# Patient Record
Sex: FEMALE | Race: WHITE | NOT HISPANIC OR LATINO | Employment: FULL TIME | URBAN - METROPOLITAN AREA
[De-identification: names, ages, dates, MRNs, and addresses within clinical notes are randomized per-mention and may not be internally consistent; named-entity substitution may affect disease eponyms.]

---

## 2023-11-22 ENCOUNTER — OFFICE VISIT (OUTPATIENT)
Dept: FAMILY MEDICINE | Facility: CLINIC | Age: 43
End: 2023-11-22
Payer: COMMERCIAL

## 2023-11-22 VITALS
OXYGEN SATURATION: 98 % | BODY MASS INDEX: 28.47 KG/M2 | HEIGHT: 61 IN | HEART RATE: 79 BPM | DIASTOLIC BLOOD PRESSURE: 78 MMHG | WEIGHT: 150.81 LBS | TEMPERATURE: 97 F | SYSTOLIC BLOOD PRESSURE: 100 MMHG

## 2023-11-22 DIAGNOSIS — R05.9 COUGH, UNSPECIFIED TYPE: ICD-10-CM

## 2023-11-22 DIAGNOSIS — J10.1 INFLUENZA A: Primary | ICD-10-CM

## 2023-11-22 LAB
CTP QC/QA: YES
CTP QC/QA: YES
POC MOLECULAR INFLUENZA A AGN: POSITIVE
POC MOLECULAR INFLUENZA B AGN: NEGATIVE
SARS-COV-2 RDRP RESP QL NAA+PROBE: NEGATIVE

## 2023-11-22 PROCEDURE — 99999 PR PBB SHADOW E&M-NEW PATIENT-LVL III: CPT | Mod: PBBFAC,,, | Performed by: STUDENT IN AN ORGANIZED HEALTH CARE EDUCATION/TRAINING PROGRAM

## 2023-11-22 PROCEDURE — 87502 INFLUENZA DNA AMP PROBE: CPT | Mod: QW,S$GLB,, | Performed by: STUDENT IN AN ORGANIZED HEALTH CARE EDUCATION/TRAINING PROGRAM

## 2023-11-22 PROCEDURE — 99999 PR PBB SHADOW E&M-NEW PATIENT-LVL III: ICD-10-PCS | Mod: PBBFAC,,, | Performed by: STUDENT IN AN ORGANIZED HEALTH CARE EDUCATION/TRAINING PROGRAM

## 2023-11-22 PROCEDURE — 1159F MED LIST DOCD IN RCRD: CPT | Mod: CPTII,S$GLB,, | Performed by: STUDENT IN AN ORGANIZED HEALTH CARE EDUCATION/TRAINING PROGRAM

## 2023-11-22 PROCEDURE — 99213 PR OFFICE/OUTPT VISIT, EST, LEVL III, 20-29 MIN: ICD-10-PCS | Mod: S$GLB,,, | Performed by: STUDENT IN AN ORGANIZED HEALTH CARE EDUCATION/TRAINING PROGRAM

## 2023-11-22 PROCEDURE — 1159F PR MEDICATION LIST DOCUMENTED IN MEDICAL RECORD: ICD-10-PCS | Mod: CPTII,S$GLB,, | Performed by: STUDENT IN AN ORGANIZED HEALTH CARE EDUCATION/TRAINING PROGRAM

## 2023-11-22 PROCEDURE — 87502 POCT INFLUENZA A/B MOLECULAR: ICD-10-PCS | Mod: QW,S$GLB,, | Performed by: STUDENT IN AN ORGANIZED HEALTH CARE EDUCATION/TRAINING PROGRAM

## 2023-11-22 PROCEDURE — 99213 OFFICE O/P EST LOW 20 MIN: CPT | Mod: S$GLB,,, | Performed by: STUDENT IN AN ORGANIZED HEALTH CARE EDUCATION/TRAINING PROGRAM

## 2023-11-22 PROCEDURE — 3008F PR BODY MASS INDEX (BMI) DOCUMENTED: ICD-10-PCS | Mod: CPTII,S$GLB,, | Performed by: STUDENT IN AN ORGANIZED HEALTH CARE EDUCATION/TRAINING PROGRAM

## 2023-11-22 PROCEDURE — 87635 SARS-COV-2 COVID-19 AMP PRB: CPT | Mod: QW,S$GLB,, | Performed by: STUDENT IN AN ORGANIZED HEALTH CARE EDUCATION/TRAINING PROGRAM

## 2023-11-22 PROCEDURE — 87635: ICD-10-PCS | Mod: QW,S$GLB,, | Performed by: STUDENT IN AN ORGANIZED HEALTH CARE EDUCATION/TRAINING PROGRAM

## 2023-11-22 PROCEDURE — 3078F PR MOST RECENT DIASTOLIC BLOOD PRESSURE < 80 MM HG: ICD-10-PCS | Mod: CPTII,S$GLB,, | Performed by: STUDENT IN AN ORGANIZED HEALTH CARE EDUCATION/TRAINING PROGRAM

## 2023-11-22 PROCEDURE — 3074F PR MOST RECENT SYSTOLIC BLOOD PRESSURE < 130 MM HG: ICD-10-PCS | Mod: CPTII,S$GLB,, | Performed by: STUDENT IN AN ORGANIZED HEALTH CARE EDUCATION/TRAINING PROGRAM

## 2023-11-22 PROCEDURE — 3074F SYST BP LT 130 MM HG: CPT | Mod: CPTII,S$GLB,, | Performed by: STUDENT IN AN ORGANIZED HEALTH CARE EDUCATION/TRAINING PROGRAM

## 2023-11-22 PROCEDURE — 3078F DIAST BP <80 MM HG: CPT | Mod: CPTII,S$GLB,, | Performed by: STUDENT IN AN ORGANIZED HEALTH CARE EDUCATION/TRAINING PROGRAM

## 2023-11-22 PROCEDURE — 3008F BODY MASS INDEX DOCD: CPT | Mod: CPTII,S$GLB,, | Performed by: STUDENT IN AN ORGANIZED HEALTH CARE EDUCATION/TRAINING PROGRAM

## 2023-11-22 RX ORDER — OSELTAMIVIR PHOSPHATE 75 MG/1
75 CAPSULE ORAL 2 TIMES DAILY
Qty: 10 CAPSULE | Refills: 0 | Status: SHIPPED | OUTPATIENT
Start: 2023-11-22 | End: 2023-11-27

## 2023-11-22 NOTE — PROGRESS NOTES
"Same day clinic    Holly Miranda is a 43 y.o. year old      White female  has no past medical history on file.. Pt presented to the clinic for flu-like symptoms for the past 2 days.  Symptoms include dry cough, nasal congestion, myalgias, headache.  History of sick contact present.   is diagnosed with flu 1 week ago      ROS:  Negative except above    Vitals:    11/22/23 1022   BP: 100/78   BP Location: Right arm   Patient Position: Sitting   BP Method: Large (Manual)   Pulse: 79   Temp: 96.7 °F (35.9 °C)   TempSrc: Tympanic   SpO2: 98%   Weight: 68.4 kg (150 lb 12.7 oz)   Height: 5' 1" (1.549 m)       Body mass index is 28.49 kg/m².     PE:  General - Well developed, alert and oriented in NAD  HEENT - normocephalic, no evidence of trauma, sclera white, EOMI, nasal congestion present  Neck - full range of motion  COR - regular rate and rhythm without murmurs or gallops  Lungs - Clear  Abdomen - soft, non-tender  Ext - no cyanosis or edema    No results found for: "WBC", "HGB", "HCT", "MCV", "MCH", "MCHC", "RDW", "PLT", "MPV", "NEUTROABS", "LYMPHOABS", "MONOABS", "EOSINOABS", "BASOSABS", "NEUTROPCT", "LYMPHOPCT", "MONOPCT", "EOSINOPCT", "BASOPCT", "DIFFTYPE", "RBCMORPHOLOG", "PLTEST"    No results found for: "NA", "K", "CL", "CO2", "GLU", "BUN", "LABCREA", "CALCIUM", "PROT", "ALBUMIN", "BILITOT", "AST", "ALKPHOS", "ALT", "GFRAA", "GFRNONAA"    No results found for: "TRIG", "CHOL", "HDL", "LDLCALC", "CHOLHDL", "NONHDLC"      No results found for: "LABA1C", "HGBA1C"    No results found for: "MICROALBUR", "[id number]"      Impression:  1. Influenza A  oseltamivir (TAMIFLU) 75 MG capsule      2. Cough, unspecified type  POCT COVID-19 Rapid Screening    POCT Influenza A/B Molecular         POCT influenza is positive.  POCT COVID is negative  Patient has a personal history of asthma.  Prescription sent for Tamiflu 75 mg b.i.d. for 5 days.  ER precautions given    Orders Placed This Encounter   Procedures    POCT " COVID-19 Rapid Screening    POCT Influenza A/B Molecular       No follow-ups on file.     Reema Dai MD

## 2023-11-22 NOTE — LETTER
November 22, 2023      [facility]  [address] [address] [address]  Phone: [phone]  Fax: [phone]       Patient: Holly Miranda   YOB: 1980  Date of Visit: 11/22/2023    To Whom It May Concern:    Leesa Miranda  was at [facility] on 11/22/2023. Please excuse patient for 11/21/23 and 11/22/2023.The patient may return to work/school on 11/27/2023 with no restrictions. If you have any questions or concerns, or if I can be of further assistance, please do not hesitate to contact me.    Sincerely,    Dr. Suhas ROSENABUM.

## 2023-12-28 ENCOUNTER — HOSPITAL ENCOUNTER (OUTPATIENT)
Dept: RADIOLOGY | Facility: HOSPITAL | Age: 43
Discharge: HOME | End: 2023-12-28
Attending: STUDENT IN AN ORGANIZED HEALTH CARE EDUCATION/TRAINING PROGRAM
Payer: COMMERCIAL

## 2023-12-28 ENCOUNTER — OFFICE VISIT (OUTPATIENT)
Dept: FAMILY MEDICINE | Facility: CLINIC | Age: 43
End: 2023-12-28
Payer: COMMERCIAL

## 2023-12-28 VITALS
OXYGEN SATURATION: 99 % | HEIGHT: 62 IN | BODY MASS INDEX: 28.32 KG/M2 | WEIGHT: 153.88 LBS | HEART RATE: 77 BPM | SYSTOLIC BLOOD PRESSURE: 120 MMHG | DIASTOLIC BLOOD PRESSURE: 80 MMHG

## 2023-12-28 DIAGNOSIS — M77.9 BONE SPUR: Primary | ICD-10-CM

## 2023-12-28 DIAGNOSIS — M77.9 BONE SPUR: ICD-10-CM

## 2023-12-28 PROCEDURE — 1159F MED LIST DOCD IN RCRD: CPT | Mod: CPTII,S$GLB,, | Performed by: STUDENT IN AN ORGANIZED HEALTH CARE EDUCATION/TRAINING PROGRAM

## 2023-12-28 PROCEDURE — 73080 X-RAY EXAM OF ELBOW: CPT | Mod: TC,FY,PO,LT

## 2023-12-28 PROCEDURE — 3008F PR BODY MASS INDEX (BMI) DOCUMENTED: ICD-10-PCS | Mod: CPTII,S$GLB,, | Performed by: STUDENT IN AN ORGANIZED HEALTH CARE EDUCATION/TRAINING PROGRAM

## 2023-12-28 PROCEDURE — 99999 PR PBB SHADOW E&M-EST. PATIENT-LVL III: ICD-10-PCS | Mod: PBBFAC,,, | Performed by: STUDENT IN AN ORGANIZED HEALTH CARE EDUCATION/TRAINING PROGRAM

## 2023-12-28 PROCEDURE — 99213 PR OFFICE/OUTPT VISIT, EST, LEVL III, 20-29 MIN: ICD-10-PCS | Mod: S$GLB,,, | Performed by: STUDENT IN AN ORGANIZED HEALTH CARE EDUCATION/TRAINING PROGRAM

## 2023-12-28 PROCEDURE — 3079F PR MOST RECENT DIASTOLIC BLOOD PRESSURE 80-89 MM HG: ICD-10-PCS | Mod: CPTII,S$GLB,, | Performed by: STUDENT IN AN ORGANIZED HEALTH CARE EDUCATION/TRAINING PROGRAM

## 2023-12-28 PROCEDURE — 3074F SYST BP LT 130 MM HG: CPT | Mod: CPTII,S$GLB,, | Performed by: STUDENT IN AN ORGANIZED HEALTH CARE EDUCATION/TRAINING PROGRAM

## 2023-12-28 PROCEDURE — 99999 PR PBB SHADOW E&M-EST. PATIENT-LVL III: CPT | Mod: PBBFAC,,, | Performed by: STUDENT IN AN ORGANIZED HEALTH CARE EDUCATION/TRAINING PROGRAM

## 2023-12-28 PROCEDURE — 1159F PR MEDICATION LIST DOCUMENTED IN MEDICAL RECORD: ICD-10-PCS | Mod: CPTII,S$GLB,, | Performed by: STUDENT IN AN ORGANIZED HEALTH CARE EDUCATION/TRAINING PROGRAM

## 2023-12-28 PROCEDURE — 3079F DIAST BP 80-89 MM HG: CPT | Mod: CPTII,S$GLB,, | Performed by: STUDENT IN AN ORGANIZED HEALTH CARE EDUCATION/TRAINING PROGRAM

## 2023-12-28 PROCEDURE — 99213 OFFICE O/P EST LOW 20 MIN: CPT | Mod: S$GLB,,, | Performed by: STUDENT IN AN ORGANIZED HEALTH CARE EDUCATION/TRAINING PROGRAM

## 2023-12-28 PROCEDURE — 73080 XR ELBOW COMPLETE 3 VIEW LEFT: ICD-10-PCS | Mod: 26,LT,, | Performed by: RADIOLOGY

## 2023-12-28 PROCEDURE — 73080 X-RAY EXAM OF ELBOW: CPT | Mod: 26,LT,, | Performed by: RADIOLOGY

## 2023-12-28 PROCEDURE — 3008F BODY MASS INDEX DOCD: CPT | Mod: CPTII,S$GLB,, | Performed by: STUDENT IN AN ORGANIZED HEALTH CARE EDUCATION/TRAINING PROGRAM

## 2023-12-28 PROCEDURE — 3074F PR MOST RECENT SYSTOLIC BLOOD PRESSURE < 130 MM HG: ICD-10-PCS | Mod: CPTII,S$GLB,, | Performed by: STUDENT IN AN ORGANIZED HEALTH CARE EDUCATION/TRAINING PROGRAM

## 2023-12-28 NOTE — PROGRESS NOTES
"Same day clinic    Holly Miranda is a 43 y.o. year old White female  has a past medical history of Asthma.. Pt presented to the clinic for the concern of bony protrusion on her lateral epicondyle of left elbow.  She noticed that 1 month ago but lately she has been having some pain in that area.  Denies any trauma recently      ROS: No fever, chills, cough, chest pain, shortness of breath, nausea, vomiting or diarrhea.      Vitals:    12/28/23 1634   BP: 120/80   BP Location: Right arm   Patient Position: Sitting   BP Method: Large (Manual)   Pulse: 77   SpO2: 99%   Weight: 69.8 kg (153 lb 14.1 oz)   Height: 5' 2" (1.575 m)       Body mass index is 28.15 kg/m².     PE:  General - Well developed, alert and oriented in NAD  HEENT - normocephalic, no evidence of trauma, sclera white, EOMI  Neck - full range of motion  COR - regular rate and rhythm without murmurs or gallops  Lungs - Clear  Abdomen - soft, non-tender  Ext - no cyanosis or edema, bony protrusion on the lateral epicondyle area, mild tenderness to palpation present.     No results found for: "WBC", "HGB", "HCT", "MCV", "MCH", "MCHC", "RDW", "PLT", "MPV", "NEUTROABS", "LYMPHOABS", "MONOABS", "EOSINOABS", "BASOSABS", "NEUTROPCT", "LYMPHOPCT", "MONOPCT", "EOSINOPCT", "BASOPCT", "DIFFTYPE", "RBCMORPHOLOG", "PLTEST"    No results found for: "NA", "K", "CL", "CO2", "GLU", "BUN", "LABCREA", "CALCIUM", "PROT", "ALBUMIN", "BILITOT", "AST", "ALKPHOS", "ALT", "GFRAA", "GFRNONAA"    No results found for: "TRIG", "CHOL", "HDL", "LDLCALC", "CHOLHDL", "NONHDLC"      No results found for: "LABA1C", "HGBA1C"    No results found for: "MICROALBUR", "[id number]"      Impression:  1. Bone spur  X-Ray Elbow Complete Left           Plan:  X-ray showed mild calcification around the lateral epicondyle without any acute fracture.  Continue conservative management including analgesia and ice/heat therapy    Orders Placed This Encounter   Procedures    X-Ray Elbow Complete Left       No " follow-ups on file.     Reema Dai MD

## 2024-01-04 ENCOUNTER — PATIENT MESSAGE (OUTPATIENT)
Dept: FAMILY MEDICINE | Facility: CLINIC | Age: 44
End: 2024-01-04
Payer: COMMERCIAL

## 2024-01-05 ENCOUNTER — OFFICE VISIT (OUTPATIENT)
Dept: URGENT CARE | Facility: CLINIC | Age: 44
End: 2024-01-05
Payer: COMMERCIAL

## 2024-01-05 VITALS
WEIGHT: 154.13 LBS | DIASTOLIC BLOOD PRESSURE: 75 MMHG | HEART RATE: 89 BPM | HEIGHT: 62 IN | TEMPERATURE: 98 F | RESPIRATION RATE: 18 BRPM | SYSTOLIC BLOOD PRESSURE: 118 MMHG | OXYGEN SATURATION: 98 % | BODY MASS INDEX: 28.36 KG/M2

## 2024-01-05 DIAGNOSIS — M77.10 LATERAL EPICONDYLITIS, UNSPECIFIED LATERALITY: Primary | ICD-10-CM

## 2024-01-05 PROCEDURE — 99213 OFFICE O/P EST LOW 20 MIN: CPT | Mod: S$GLB,,,

## 2024-01-05 RX ORDER — NAPROXEN 500 MG/1
500 TABLET ORAL 2 TIMES DAILY WITH MEALS
Qty: 20 TABLET | Refills: 0 | Status: SHIPPED | OUTPATIENT
Start: 2024-01-05 | End: 2024-01-15

## 2024-01-05 NOTE — PROGRESS NOTES
"Subjective:      Patient ID: Holly Miranda is a 43 y.o. female.    Vitals:  height is 5' 2" (1.575 m) and weight is 69.9 kg (154 lb 1.6 oz). Her oral temperature is 98 °F (36.7 °C). Her blood pressure is 118/75 and her pulse is 89. Her respiration is 18 and oxygen saturation is 98%.     Chief Complaint: Elbow Injury    Holly Miranda  is a 43 y.o. female whom presents to urgent care with complaints of left elbow pain. Pt reports she was seen by her PCP on 12/28 after she noticed a slight bulge in her elbow and then started with pain. Pt reports they did xray at that visit and said it was possibly due to a bone spur. Patient has taken Advil, Biofreeze for symptom relief. Patient reports no improvement with treatment. Pt denies any numbness or tingling.          Elbow Injury  This is a new problem. The current episode started yesterday. The problem occurs constantly. The problem has been gradually worsening. Associated symptoms include joint swelling. Pertinent negatives include no abdominal pain, anorexia, arthralgias, change in bowel habit, chest pain, chills, congestion, coughing, diaphoresis, fatigue, fever, headaches, myalgias, nausea, neck pain, numbness, rash, sore throat, swollen glands, urinary symptoms, vertigo, visual change, vomiting or weakness. The symptoms are aggravated by twisting and exertion. She has tried NSAIDs for the symptoms. The treatment provided no relief.       Constitution: Negative for chills, sweating, fatigue and fever.   HENT:  Negative for congestion and sore throat.    Neck: Negative for neck pain.   Cardiovascular:  Negative for chest pain.   Respiratory:  Negative for cough.    Gastrointestinal:  Negative for abdominal pain, nausea and vomiting.   Musculoskeletal:  Positive for joint swelling. Negative for joint pain and muscle ache.   Skin:  Negative for rash.   Neurological:  Negative for history of vertigo, headaches and numbness.      Objective:     Physical Exam   Constitutional: " She is oriented to person, place, and time. She appears well-developed. She is cooperative. She does not appear ill. No distress.   HENT:   Head: Normocephalic and atraumatic.   Ears:   Right Ear: Hearing normal.   Left Ear: Hearing normal.   Eyes: Lids are normal.   Neck: Trachea normal and phonation normal. Neck supple.   Cardiovascular: Normal rate, regular rhythm, normal heart sounds and normal pulses.   No murmur heard.  Pulmonary/Chest: Effort normal and breath sounds normal. No respiratory distress. She has no wheezes.   Abdominal: Normal appearance and bowel sounds are normal. Soft.   Musculoskeletal: Normal range of motion.         General: Swelling and tenderness present. Normal range of motion.      Left elbow: She exhibits swelling. She exhibits normal range of motion and no laceration. Tenderness found. Lateral epicondyle tenderness noted.      Comments: TTP to lateral epicondyle, cap refill <2 seconds, FROM (pain with flexion and extension), mild swelling noted, no bruising or erythema, radial pulse 2+   Neurological: She is alert and oriented to person, place, and time. She exhibits normal muscle tone.   Skin: Skin is warm, dry and intact.   Psychiatric: Her speech is normal and behavior is normal. Judgment and thought content normal.   Nursing note and vitals reviewed.      Assessment:     1. Lateral epicondylitis, unspecified laterality        Plan:       Lateral epicondylitis, unspecified laterality  -     naproxen (NAPROSYN) 500 MG tablet; Take 1 tablet (500 mg total) by mouth 2 (two) times daily with meals. for 10 days  Dispense: 20 tablet; Refill: 0  -     SLING FOR HOME USE          Medical Decision Making:   History:   Old Medical Records: I decided to obtain old medical records.  Urgent Care Management:  Pt in no acute distress. Vitals reassuring. Non-toxic appearing. Reviewed xray from previous visit with PCP on 12/28- no acute findings but possible calcifications noted to lateral  epicondyle. Discussed OTC medications and RICE. Ace wrap and sling applied in clinic. Discussed prescription for Naproxen.  Discussed f/u with PCP if symptoms persist. Discussed the importance of further evaluation if symptoms worsen. Patient stated verbal understanding.         Patient Instructions   R.I.C.E:    Rest, apply ice intermittently, compress with ace wrap, and elevate above heart level as much as possible.  Do not apply ice directly to skin. Wrap ice in cloth before applying.    OTC Tylenol (acetaminophen) up to 4,000 mg a day is safe for short periods and can be used for pain, and fever. However in high doses and prolonged use it can cause liver irritation.    Take the naproxen for pain/inflammation.     OTC Voltaren topical cream may be applied for relief, as long as you do not have any allergy to the ingredients.    Follow up with PCP if pain persists.

## 2024-01-05 NOTE — LETTER
January 5, 2024      [facility] [place]  [address], [address]  [facility] [address]  Phone: [phone]  Fax: [phone]       Patient: Holly Miranda   YOB: 1980  Date of Visit: 01/05/2024    To Whom It May Concern:    Leesa Miranda  was at [facility] on 01/05/2024. The patient may return to work/school on 1/8/23 with no restrictions. If you have any questions or concerns, or if I can be of further assistance, please do not hesitate to contact me.    Sincerely,    Maureen Kasper, NP

## 2024-01-05 NOTE — PATIENT INSTRUCTIONS
R.I.C.E:    Rest, apply ice intermittently, compress with ace wrap, and elevate above heart level as much as possible.  Do not apply ice directly to skin. Wrap ice in cloth before applying.    OTC Tylenol (acetaminophen) up to 4,000 mg a day is safe for short periods and can be used for pain, and fever. However in high doses and prolonged use it can cause liver irritation.    Take the naproxen for pain/inflammation.     OTC Voltaren topical cream may be applied for relief, as long as you do not have any allergy to the ingredients.    Follow up with PCP if pain persists.